# Patient Record
Sex: FEMALE | Race: ASIAN
[De-identification: names, ages, dates, MRNs, and addresses within clinical notes are randomized per-mention and may not be internally consistent; named-entity substitution may affect disease eponyms.]

---

## 2017-09-06 PROBLEM — Z00.00 ENCOUNTER FOR PREVENTIVE HEALTH EXAMINATION: Status: ACTIVE | Noted: 2017-09-06

## 2017-12-28 ENCOUNTER — APPOINTMENT (OUTPATIENT)
Dept: OTOLARYNGOLOGY | Facility: CLINIC | Age: 52
End: 2017-12-28

## 2023-03-16 ENCOUNTER — APPOINTMENT (OUTPATIENT)
Dept: NEUROLOGY | Facility: CLINIC | Age: 58
End: 2023-03-16
Payer: MEDICAID

## 2023-03-16 VITALS
WEIGHT: 105 LBS | BODY MASS INDEX: 17.49 KG/M2 | HEIGHT: 65 IN | HEART RATE: 74 BPM | DIASTOLIC BLOOD PRESSURE: 83 MMHG | SYSTOLIC BLOOD PRESSURE: 112 MMHG

## 2023-03-16 DIAGNOSIS — Z86.39 PERSONAL HISTORY OF OTHER ENDOCRINE, NUTRITIONAL AND METABOLIC DISEASE: ICD-10-CM

## 2023-03-16 DIAGNOSIS — Z78.9 OTHER SPECIFIED HEALTH STATUS: ICD-10-CM

## 2023-03-16 DIAGNOSIS — Z85.3 PERSONAL HISTORY OF MALIGNANT NEOPLASM OF BREAST: ICD-10-CM

## 2023-03-16 PROCEDURE — 99204 OFFICE O/P NEW MOD 45 MIN: CPT

## 2023-03-16 RX ORDER — ATORVASTATIN CALCIUM 10 MG/1
10 TABLET, FILM COATED ORAL
Refills: 0 | Status: ACTIVE | COMMUNITY

## 2023-03-16 RX ORDER — METOPROLOL TARTRATE 25 MG/1
25 TABLET, FILM COATED ORAL
Refills: 0 | Status: ACTIVE | COMMUNITY

## 2023-03-16 NOTE — REVIEW OF SYSTEMS
[Numbness] : numbness [Tingling] : tingling [Tension Headache] : tension-type headaches [Sleep Disturbances] : sleep disturbances [Negative] : Neurological

## 2023-03-20 NOTE — HISTORY OF PRESENT ILLNESS
[FreeTextEntry1] : Ms. Escobedo is a 57-year-old woman who presents today in neurologic consultation for symptoms of pains and tingling in her fingers and her feet beginning in 2019 when she started getting chemotherapy for breast cancer. The chemotherapy was completed in 2021, but she still has tingling and pain in her fingers and the bottom of her feet. Of note is the fact that patient did see me in 2009 for symptoms of numbness and tingling in her fingertips and pains in her wrists. EMG of the UE was normal at that time, but since then she has had chemotherapy.

## 2023-03-20 NOTE — ASSESSMENT
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 I ordered EMGs of the UE and LE to assess the severity. We will see her back in follow up when testing is complete. \par \par Total clinician time spent today on the patient is 45 minutes including preparing to see the patient, obtaining and/or reviewing and confirming history, performing medically necessary and appropriate examination, counseling and educating the patient and/or family, documenting clinical information in the EHR and communicating and/or referring to other healthcare professionals.\par \par Entered by Aviva Moreau acting as scribe for Dr. Baker.\par \par \par The documentation recorded by the scribe, in my presence, accurately reflects the service I personally performed, and the decisions made by me with my edits as appropriate. \par Sergei Baker MD, FAAN, FACP\par Diplomate [employer]\par \par

## 2023-03-24 ENCOUNTER — APPOINTMENT (OUTPATIENT)
Dept: NEUROLOGY | Facility: CLINIC | Age: 58
End: 2023-03-24
Payer: MEDICAID

## 2023-03-24 PROCEDURE — 95911 NRV CNDJ TEST 9-10 STUDIES: CPT

## 2023-03-24 PROCEDURE — 95886 MUSC TEST DONE W/N TEST COMP: CPT

## 2023-03-31 ENCOUNTER — APPOINTMENT (OUTPATIENT)
Dept: NEUROLOGY | Facility: CLINIC | Age: 58
End: 2023-03-31
Payer: MEDICAID

## 2023-03-31 PROCEDURE — 95912 NRV CNDJ TEST 11-12 STUDIES: CPT

## 2023-03-31 PROCEDURE — 95886 MUSC TEST DONE W/N TEST COMP: CPT

## 2023-04-26 ENCOUNTER — APPOINTMENT (OUTPATIENT)
Dept: NEUROLOGY | Facility: CLINIC | Age: 58
End: 2023-04-26

## 2023-05-11 ENCOUNTER — APPOINTMENT (OUTPATIENT)
Dept: NEUROLOGY | Facility: CLINIC | Age: 58
End: 2023-05-11
Payer: MEDICAID

## 2023-05-11 VITALS
HEART RATE: 80 BPM | WEIGHT: 105 LBS | HEIGHT: 65 IN | SYSTOLIC BLOOD PRESSURE: 125 MMHG | DIASTOLIC BLOOD PRESSURE: 78 MMHG | BODY MASS INDEX: 17.49 KG/M2

## 2023-05-11 DIAGNOSIS — G62.9 POLYNEUROPATHY, UNSPECIFIED: ICD-10-CM

## 2023-05-11 PROCEDURE — 99214 OFFICE O/P EST MOD 30 MIN: CPT

## 2023-05-11 NOTE — HISTORY OF PRESENT ILLNESS
[FreeTextEntry1] : ORIGINAL PRESENTATION:  Ms. Escobedo is a 57-year-old woman who presents today in neurologic consultation for symptoms of pains and tingling in her fingers and her feet beginning in 2019 when she started getting chemotherapy for breast cancer. The chemotherapy was completed in 2021, but she still has tingling and pain in her fingers and the bottom of her feet. Of note is the fact that patient did see me in 2009 for symptoms of numbness and tingling in her fingertips and pains in her wrists. EMG of the UE was normal at that time, but since then she has had chemotherapy. \par \par TODAY:  I had the pleasure of seeing Ms. Escobedo today in follow up.  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  She underwent EMGs of the upper and lower extremities for further evaluation of her symptoms which came back normal.  She states the parasthesias have much improved and don’t occur as often as they used to.  She denies any pain and does not require any medication or further treatment at this time.

## 2023-05-11 NOTE — ASSESSMENT
[FreeTextEntry1] : 57 year old female with parasthesias that are improving.  We will f/u as needed and if there is any issue she will contact the office.\par \par I personally reviewed with the PA, this patient's history and physical exam findings, as documented above. I have discussed the relevant areas of concern, having direct implications to the presenting problems and illnesses, and I have personally examined all pertinent and positive and negative findings, which impact on the prior neurological treatment. \par \par \par Sherrie Barakat, MS, PA-C\par Sergei Baker MD\par \par